# Patient Record
Sex: FEMALE | URBAN - METROPOLITAN AREA
[De-identification: names, ages, dates, MRNs, and addresses within clinical notes are randomized per-mention and may not be internally consistent; named-entity substitution may affect disease eponyms.]

---

## 2017-06-26 ENCOUNTER — TRANSFERRED RECORDS (OUTPATIENT)
Dept: HEALTH INFORMATION MANAGEMENT | Facility: CLINIC | Age: 62
End: 2017-06-26

## 2017-07-07 ENCOUNTER — TRANSFERRED RECORDS (OUTPATIENT)
Dept: HEALTH INFORMATION MANAGEMENT | Facility: CLINIC | Age: 62
End: 2017-07-07

## 2017-10-16 ENCOUNTER — TRANSFERRED RECORDS (OUTPATIENT)
Dept: HEALTH INFORMATION MANAGEMENT | Facility: CLINIC | Age: 62
End: 2017-10-16

## 2017-10-18 ENCOUNTER — TRANSFERRED RECORDS (OUTPATIENT)
Dept: HEALTH INFORMATION MANAGEMENT | Facility: CLINIC | Age: 62
End: 2017-10-18

## 2017-10-31 ENCOUNTER — MEDICAL CORRESPONDENCE (OUTPATIENT)
Dept: HEALTH INFORMATION MANAGEMENT | Facility: CLINIC | Age: 62
End: 2017-10-31

## 2017-11-07 NOTE — TELEPHONE ENCOUNTER
APPT INFO    Date /Time:  11/20/17 10:30AM   Reason for Appt:  Lower extremity, abnormal MRI    Ref Provider/Clinic:  BRET CONNOR/ [facility]    Are there internal records? If yes, list:  none   Patient Contact (Y/N) & Call Details: No- referral   Action: Reviewed records      OUTSIDE RECORDS CHECKLIST     CLINIC NAME COMMENTS REC (x) IMG (x)   [facility]  x            Records Received From: [facility]     Date/Exam/Location  (specify location if different)   Office Notes:  10/16/17, 10/18/17   Radiology Reports: - left knee 10/16/17, 6/26/17  - left tib fib 6/26/17  - MRI left ankle 7/7/15  - MRI tib fib 7/7/17    Mariam Wilkerson Notified (Y/N): no, waiting for images    Missing: - left knee surgery and tibial fracture in 2014  - ORIF by Dr. Asad Valladares [facility] in 2014  - [facility] ORIF done   - injection with Dr. Suresh

## 2017-11-14 ASSESSMENT — ENCOUNTER SYMPTOMS
ARTHRALGIAS: 1
NECK PAIN: 1
BACK PAIN: 1
MYALGIAS: 0
MUSCLE WEAKNESS: 0
STIFFNESS: 1
JOINT SWELLING: 1
MUSCLE CRAMPS: 1

## 2017-11-17 NOTE — TELEPHONE ENCOUNTER
ACTION    What did you do?  Faxed request to [facility] and [facility].

## 2017-11-20 ENCOUNTER — PRE VISIT (OUTPATIENT)
Dept: ORTHOPEDICS | Facility: CLINIC | Age: 62
End: 2017-11-20

## 2017-11-20 ENCOUNTER — TELEPHONE (OUTPATIENT)
Dept: ORTHOPEDICS | Facility: CLINIC | Age: 62
End: 2017-11-20

## 2017-11-20 ENCOUNTER — OFFICE VISIT (OUTPATIENT)
Dept: ORTHOPEDICS | Facility: CLINIC | Age: 62
End: 2017-11-20

## 2017-11-20 VITALS — HEIGHT: 68 IN | BODY MASS INDEX: 33.16 KG/M2 | WEIGHT: 218.8 LBS

## 2017-11-20 DIAGNOSIS — M79.605 PAIN OF LEFT LOWER EXTREMITY: Primary | ICD-10-CM

## 2017-11-20 RX ORDER — PANTOPRAZOLE SODIUM 40 MG/1
40 TABLET, DELAYED RELEASE ORAL
COMMUNITY
Start: 2017-01-27

## 2017-11-20 RX ORDER — POTASSIUM &MAGNESIUM ASPARTATE 250-250 MG
CAPSULE ORAL
COMMUNITY
Start: 2007-12-05

## 2017-11-20 RX ORDER — EPINEPHRINE 0.3 MG/.3ML
0.3 INJECTION SUBCUTANEOUS
COMMUNITY
Start: 2015-04-27

## 2017-11-20 NOTE — MR AVS SNAPSHOT
"              After Visit Summary   11/20/2017    Ely Cannon    MRN: [id number]           Patient Information     Date Of Birth          1955        Visit Information        Provider Department      11/20/2017 10:30 AM Kleber Hamilton MD [facility]        Today's Diagnoses     Pain of left lower extremity    -  1       Follow-ups after your visit        Who to contact     Please call your clinic at [phone] to:    Ask questions about your health    Make or cancel appointments    Discuss your medicines    Learn about your test results    Speak to your doctor   If you have compliments or concerns about an experience at your clinic, or if you wish to file a complaint, please contact [facility] Patient Relations at [phone] or email us at [email or web address]         Additional Information About Your Visit        MyChart Information     [employer] gives you secure access to your electronic health record. If you see a primary care provider, you can also send messages to your care team and make appointments. If you have questions, please call your primary care clinic.  If you do not have a primary care provider, please call [phone] and they will assist you.      [employer] is an electronic gateway that provides easy, online access to your medical records. With [employer], you can request a clinic appointment, read your test results, renew a prescription or communicate with your care team.     To access your existing account, please contact your [facility] or call [phone] for assistance.        Care EveryWhere ID     This is your Care EveryWhere ID. This could be used by other organizations to access your [facility] medical records  [id number]        Your Vitals Were     Height BMI (Body Mass Index)                1.715 m (5' 7.5\") 33.76 kg/m2           Blood Pressure from Last 3 Encounters:   No data found for BP    " Weight from Last 3 Encounters:   11/20/17 99.2 kg (218 lb 12.8 oz)               Primary Care Provider Office Phone # Fax #    Hussein Sorensen [phone]       [facility] [address] [facility] [address]        Equal Access to Services     JOHN PAULKAITLIN ALMA : Hadii aad ku hadasho Soomaali, waaxda luqadaha, qaybta kaalmada adeegyada, waxay ulicesin hayjaysonn adepraveena wade zohra fitzpatrick. So [facility] [phone].    ATENCIÓN: Si habla español, tiene a lopez disposición servicios gratuitos de asistencia lingüística. Hoang al [phone].    We comply with applicable federal civil rights laws and [place] laws. We do not discriminate on the basis of race, color, national origin, age, disability, sex, sexual orientation, or gender identity.            Thank you!     Thank you for choosing [facility]  for your care. Our goal is always to provide you with excellent care. Hearing back from our patients is one way we can continue to improve our services. Please take a few minutes to complete the written survey that you may receive in the mail after your visit with us. Thank you!             Your Updated Medication List - Protect others around you: Learn how to safely use, store and throw away your medicines at www.disposemymeds.org.          This list is accurate as of: 11/20/17 11:59 PM.  Always use your most recent med list.                   Brand Name Dispense Instructions for use Diagnosis    ALLEGRA PO      over the counter allergy meds every 4 hours as needed        EPINEPHrine 0.3 MG/0.3ML injection 2-pack    EPIPEN/ADRENACLICK/or ANY BX GENERIC EQUIV     Inject 0.3 mg into the muscle        order for DME      Knee scooter        pantoprazole 40 MG EC tablet    PROTONIX     Take 40 mg by mouth        Potassium & Magnesium Aspartat 250-250 MG Caps

## 2017-11-20 NOTE — LETTER
11/20/2017       RE: Ely Cannon  [address]  [facility] [address]     Dear Colleague,    Thank you for referring your patient, Ely Cannon, to the [facility] at [facility]. Please see a copy of my visit note below.        [facility], Orthopaedic Oncology Surgery Consultation  by Kleber Hamilton M.D.    Ely Cannon MRN# [id number]   Age: 62 year old YOB: 1955     Requesting physician: Hussein Henriquez            Assessment and Plan:   Assessment:  62-year-old female 3 years status post left lateral tibial plateau open reduction internal fixation with ongoing left shin and ankle pain. Ankle pain is likely attributable to anterior tarsal tunnel syndrome. The shin pain etiology is currently unknown but we will further evaluate as below.     Plan:  -We will review the left lower extremity MRI when we have received it in the interim we'll order a bone scan for further evaluation.  -We will call the patient with the results of the MRI after we have reviewed it           History of Present Illness:   62 year old female who presents for evaluation of left shin and ankle pain. She has previously sustained a left lateral tibial plateau fracture in the winter of 2014 and underwent open reduction internal fixation (please see treatments below). Since that time she states that she has had ongoing pain in her left ankle of uncertain etiology and recently had a left ankle steroid injection July 2017 with some relief of symptoms. She also has a dull aching pain in her left shin which started in May 2017. Despite a left knee steroid injection which decreased her knee pain she has ongoing shin pain. It does not seem to be exacerbated with weightbearing but is very painful if it gets bumped. Both of these pains are greatly affecting her ability to work. She works as a  and is currently working only part-time doing seated  "tasks.    Background history:  DX:  1. July 2017 Left tibial diaphysis stress reaction.    TREATMENTS:  1. Winter 2014, open reduction internal fixation of left, lateral tibial plateau fracture, Dr. Asad Valladares.  2. July 2017 left ankle intra-articular steroid injection  3. October 2017 left knee intra-articular steroid injection         Physical Exam:     EXAMINATION pertinent findings:   VITAL SIGNS: Height 1.715 m (5' 7.5\"), weight 99.2 kg (218 lb 12.8 oz).  Body mass index is 33.76 kg/(m^2).  RESP: non labored breathing   ABD: benign   SKIN: grossly normal   LYMPHATIC: grossly normal   NEURO: grossly normal   VASCULAR: satisfactory perfusion of all extremities   MUSCULOSKELETAL:   Ambulates with reciprocal heel toe gait but slight antalgia on the left. Left lateral knee incision is well-healed without erythema or drainage, there is no effusion erythema or warmth. She tolerates knee range of motion from 0-100  the knee is stable to varus and valgus stress at 0 and 30  of flexion. With a tenderness to deep palpation over the tibial diaphysis medially. There is palpable crepitus with subtalar inversion eversion but anterior  dorsiflexion and plantar flexion are negative for instability.  There is reproducible shocklike sensation with Tinel's over the anterior aspect of the ankle joint. 5 out of 5 strength tibialis anterior gastroc psoas complex extensor hallucis longus sensation intact to light touch in the sural saphenous tibial superficial and deep peroneal nerve distributions, palpable dorsalis pedis pulses.                Data:   All laboratory data reviewed  All imaging study reports reviewed. Report as below as we were unable to view the images from the outside hospital.     MRI Tibia/Fibula:7/7/2017:    HISTORY:  Chronic left shin pain with history of previous fracture and surgery of the proximal tibia in 2014.      Technique:  Routine long bone protocol.      Findings:  There is a long segment of " abnormal bone marrow signal intensity seen throughout the mid and distal diaphysis of the left tibia consisting of patchy increased T2 signal intensity. This is seen over a length of approximately 18 cm. No associated fracture is noted. There is a circumferential periosteal and soft tissue halo of increased T2 signal intensity about the bone at this level as well. The fibula is unremarkable with the exception of a degenerative cyst distally.  No soft tissue mass lesion or fluid collection is noted. The tendons are unremarkable.      Impression:  Long segment of abnormal bone marrow signal intensity and surrounding periosteal/soft tissue signal intensity in the mid and distal diaphysis of the tibia. Findings could represent osteomyelitis in the appropriate setting for infection. Consider also possible severe stress reaction in the absence of clinical findings for infection. Tumor is considered less likely given the long segment of involvement but this cannot be entirely excluded. A bone infarct should also be considered but this does not have the typical ring-like configuration for infarction.      Attending MD (Dr. Kleber Hamilton) :  This patient was seen and evaluated by me including a history, exam, and interpretation of all imaging and/or lab data.  A training physician (resident/fellow), who also saw the patient, has documented the clinic visit in the attached note using voice recognition dictation software, as such, transcription errors may be present.    MD Hever Moreno Family Professor  Oncology and Adult Reconstructive Surgery  Dept Orthopaedic Surgery, [facility]  [phone] office, [phone] pager  [email or web address]              DATA for DOCUMENTATION:         Past Medical History:     Patient Active Problem List   Diagnosis     Acute pain of right knee     Arthritis     Elevated blood pressure     Esophageal reflux     Other corticoadrenal overactivity     Osteopenia     Pain of  "lower extremity     Rectocele     Tobacco use disorder     Past Medical History:   Diagnosis Date     Arthritis      Back injury      Chronic osteoarthritis      Neck injuries          Also see scanned health assessment forms.       Past Surgical History:     Past Surgical History:   Procedure Laterality Date     C STOMACH SURGERY PROCEDURE UNLISTED     -cholecystectomy prior gallbladder tumor? In 1986 metastases  -Hysterectomy  -Breast lumpectomy in 1986, benign         Social History:     Social History     Social History     Marital status:      Spouse name: N/A     Number of children: N/A     Years of education: N/A     Occupational History     Not on file.     Social History Main Topics     Smoking status: Current Every Day Smoker     Packs/day: 1.00     Years: 20.00     Types: Cigarettes     Smokeless tobacco: Never Used     Alcohol use Yes      Comment: Social     Drug use: No     Sexual activity: Yes     Partners: Male     Birth control/ protection: Post-menopausal, Female Surgical     Other Topics Concern     Not on file     Social History Narrative     No narrative on file            Family History:       Family History   Problem Relation Age of Onset     DIABETES Brother      Hypertension Brother      Bone Cancer Brother      CEREBROVASCULAR DISEASE Father      Breast Cancer Maternal Grandmother      MENTAL ILLNESS Maternal Grandmother      Colon Cancer Mother      Colon Cancer Maternal Grandfather      Alcoholism Brother     family history of colon cancer and a brother with \"bone cancer\"         Medications:     Current Outpatient Prescriptions   Medication Sig     EPINEPHrine (EPIPEN/ADRENACLICK/OR ANY BX GENERIC EQUIV) 0.3 MG/0.3ML injection 2-pack Inject 0.3 mg into the muscle     order for DME Knee scooter     Fexofenadine HCl (ALLEGRA PO) over the counter allergy meds every 4 hours as needed      pantoprazole (PROTONIX) 40 MG EC tablet Take 40 mg by mouth     Mag Aspart-Potassium Aspart " (POTASSIUM & MAGNESIUM ASPARTAT) 250-250 MG CAPS      No current facility-administered medications for this visit.               Review of Systems:   A comprehensive 10 point review of systems (constitutional, ENT, cardiac, peripheral vascular, lymphatic, respiratory, GI, , Musculoskeletal, skin, Neurological) was performed and found to be negative except as described in this note.     See intake form completed by patient      Again, thank you for allowing me to participate in the care of your patient.      Sincerely,    Kleber Hamilton MD

## 2017-11-20 NOTE — TELEPHONE ENCOUNTER
Phone Call:    Who did you talk to? (or) Who did you call?  [facility]   Call Detail/Action:  imaging disc was sent on Friday

## 2017-11-20 NOTE — NURSING NOTE
"Chief Complaint   Patient presents with     Consult     Pt states that she is here today for an Abnormal MRI. She states that something was detected on her Left Lower Extremity. Referring:  BRET CONNOR       62 year old  1955    Ht 1.715 m (5' 7.5\")  Wt 99.2 kg (218 lb 12.8 oz)  BMI 33.76 kg/m2           [id number], [place] - [address]    Allergies   Allergen Reactions     Diatrizoate Shortness Of Breath     Sodium Hypochlorite Shortness Of Breath     Other reaction(s): Respiratory Distress     Wasp Venom Protein Shortness Of Breath     Bee Nausea and Vomiting     Contrast Dye Nausea     Current Outpatient Prescriptions   Medication     EPINEPHrine (EPIPEN/ADRENACLICK/OR ANY BX GENERIC EQUIV) 0.3 MG/0.3ML injection 2-pack     order for DME     Fexofenadine HCl (ALLEGRA PO)     pantoprazole (PROTONIX) 40 MG EC tablet     Mag Aspart-Potassium Aspart (POTASSIUM & MAGNESIUM ASPARTAT) 250-250 MG CAPS     No current facility-administered medications for this visit.            Viviane Ann C.M.A.       "

## 2017-11-20 NOTE — PROGRESS NOTES
[facility], Orthopaedic Oncology Surgery Consultation  by Kleber Hamilton M.D.    Ely Cannon MRN# [id number]   Age: 62 year old YOB: 1955     Requesting physician: Hussein Henriquez            Assessment and Plan:   Assessment:  62-year-old female 3 years status post left lateral tibial plateau open reduction internal fixation with ongoing left shin and ankle pain. Ankle pain is likely attributable to anterior tarsal tunnel syndrome. The shin pain etiology is currently unknown but we will further evaluate as below.     Plan:  -We will review the left lower extremity MRI when we have received it in the interim we'll order a bone scan for further evaluation.  -We will call the patient with the results of the MRI after we have reviewed it           History of Present Illness:   62 year old female who presents for evaluation of left shin and ankle pain. She has previously sustained a left lateral tibial plateau fracture in the winter of 2014 and underwent open reduction internal fixation (please see treatments below). Since that time she states that she has had ongoing pain in her left ankle of uncertain etiology and recently had a left ankle steroid injection July 2017 with some relief of symptoms. She also has a dull aching pain in her left shin which started in May 2017. Despite a left knee steroid injection which decreased her knee pain she has ongoing shin pain. It does not seem to be exacerbated with weightbearing but is very painful if it gets bumped. Both of these pains are greatly affecting her ability to work. She works as a  and is currently working only part-time doing seated tasks.    Background history:  DX:  1. July 2017 Left tibial diaphysis stress reaction.    TREATMENTS:  1. Winter 2014, open reduction internal fixation of left, lateral tibial plateau fracture, Dr. Asad Valladares.  2. July 2017 left ankle intra-articular steroid injection  3. October 2017  "left knee intra-articular steroid injection         Physical Exam:     EXAMINATION pertinent findings:   VITAL SIGNS: Height 1.715 m (5' 7.5\"), weight 99.2 kg (218 lb 12.8 oz).  Body mass index is 33.76 kg/(m^2).  RESP: non labored breathing   ABD: benign   SKIN: grossly normal   LYMPHATIC: grossly normal   NEURO: grossly normal   VASCULAR: satisfactory perfusion of all extremities   MUSCULOSKELETAL:   Ambulates with reciprocal heel toe gait but slight antalgia on the left. Left lateral knee incision is well-healed without erythema or drainage, there is no effusion erythema or warmth. She tolerates knee range of motion from 0-100  the knee is stable to varus and valgus stress at 0 and 30  of flexion. With a tenderness to deep palpation over the tibial diaphysis medially. There is palpable crepitus with subtalar inversion eversion but anterior  dorsiflexion and plantar flexion are negative for instability.  There is reproducible shocklike sensation with Tinel's over the anterior aspect of the ankle joint. 5 out of 5 strength tibialis anterior gastroc psoas complex extensor hallucis longus sensation intact to light touch in the sural saphenous tibial superficial and deep peroneal nerve distributions, palpable dorsalis pedis pulses.                Data:   All laboratory data reviewed  All imaging study reports reviewed. Report as below as we were unable to view the images from the outside hospital.     MRI Tibia/Fibula:7/7/2017:    HISTORY:  Chronic left shin pain with history of previous fracture and surgery of the proximal tibia in 2014.      Technique:  Routine long bone protocol.      Findings:  There is a long segment of abnormal bone marrow signal intensity seen throughout the mid and distal diaphysis of the left tibia consisting of patchy increased T2 signal intensity. This is seen over a length of approximately 18 cm. No associated fracture is noted. There is a circumferential periosteal and soft " tissue halo of increased T2 signal intensity about the bone at this level as well. The fibula is unremarkable with the exception of a degenerative cyst distally.  No soft tissue mass lesion or fluid collection is noted. The tendons are unremarkable.      Impression:  Long segment of abnormal bone marrow signal intensity and surrounding periosteal/soft tissue signal intensity in the mid and distal diaphysis of the tibia. Findings could represent osteomyelitis in the appropriate setting for infection. Consider also possible severe stress reaction in the absence of clinical findings for infection. Tumor is considered less likely given the long segment of involvement but this cannot be entirely excluded. A bone infarct should also be considered but this does not have the typical ring-like configuration for infarction.      Attending MD (Dr. Kleber Hamilton) :  This patient was seen and evaluated by me including a history, exam, and interpretation of all imaging and/or lab data.  A training physician (resident/fellow), who also saw the patient, has documented the clinic visit in the attached note using voice recognition dictation software, as such, transcription errors may be present.    Kleber Hamilton MD  [facility] Family Professor  Oncology and Adult Reconstructive Surgery  Dept Orthopaedic Surgery, [facility]  [phone] office, [phone] pager  [email or web address]              DATA for DOCUMENTATION:         Past Medical History:     Patient Active Problem List   Diagnosis     Acute pain of right knee     Arthritis     Elevated blood pressure     Esophageal reflux     Other corticoadrenal overactivity     Osteopenia     Pain of lower extremity     Rectocele     Tobacco use disorder     Past Medical History:   Diagnosis Date     Arthritis      Back injury      Chronic osteoarthritis      Neck injuries          Also see scanned health assessment forms.       Past Surgical History:     Past Surgical History:  "  Procedure Laterality Date     C STOMACH SURGERY PROCEDURE UNLISTED     -cholecystectomy prior gallbladder tumor? In 1986 metastases  -Hysterectomy  -Breast lumpectomy in 1986, benign         Social History:     Social History     Social History     Marital status:      Spouse name: N/A     Number of children: N/A     Years of education: N/A     Occupational History     Not on file.     Social History Main Topics     Smoking status: Current Every Day Smoker     Packs/day: 1.00     Years: 20.00     Types: Cigarettes     Smokeless tobacco: Never Used     Alcohol use Yes      Comment: Social     Drug use: No     Sexual activity: Yes     Partners: Male     Birth control/ protection: Post-menopausal, Female Surgical     Other Topics Concern     Not on file     Social History Narrative     No narrative on file            Family History:       Family History   Problem Relation Age of Onset     DIABETES Brother      Hypertension Brother      Bone Cancer Brother      CEREBROVASCULAR DISEASE Father      Breast Cancer Maternal Grandmother      MENTAL ILLNESS Maternal Grandmother      Colon Cancer Mother      Colon Cancer Maternal Grandfather      Alcoholism Brother     family history of colon cancer and a brother with \"bone cancer\"         Medications:     Current Outpatient Prescriptions   Medication Sig     EPINEPHrine (EPIPEN/ADRENACLICK/OR ANY BX GENERIC EQUIV) 0.3 MG/0.3ML injection 2-pack Inject 0.3 mg into the muscle     order for DME Knee scooter     Fexofenadine HCl (ALLEGRA PO) over the counter allergy meds every 4 hours as needed      pantoprazole (PROTONIX) 40 MG EC tablet Take 40 mg by mouth     Mag Aspart-Potassium Aspart (POTASSIUM & MAGNESIUM ASPARTAT) 250-250 MG CAPS      No current facility-administered medications for this visit.               Review of Systems:   A comprehensive 10 point review of systems (constitutional, ENT, cardiac, peripheral vascular, lymphatic, respiratory, GI, , " Musculoskeletal, skin, Neurological) was performed and found to be negative except as described in this note.     See intake form completed by patient    Answers for HPI/ROS submitted by the patient on 11/14/2017   General Symptoms: No  Skin Symptoms: No  HENT Symptoms: No  EYE SYMPTOMS: No  HEART SYMPTOMS: No  LUNG SYMPTOMS: No  INTESTINAL SYMPTOMS: No  URINARY SYMPTOMS: No  GYNECOLOGIC SYMPTOMS: No  BREAST SYMPTOMS: No  SKELETAL SYMPTOMS: Yes  BLOOD SYMPTOMS: No  NERVOUS SYSTEM SYMPTOMS: No  MENTAL HEALTH SYMPTOMS: No  Back pain: Yes  Muscle aches: No  Neck pain: Yes  Swollen joints: Yes  Joint pain: Yes  Bone pain: Yes  Muscle cramps: Yes  Muscle weakness: No  Joint stiffness: Yes  Bone fracture: No

## 2017-11-20 NOTE — TELEPHONE ENCOUNTER
Received Imaging From: [facility]    Image Type (x): Disc:___x__  Pacs:_____      Exam Date/Name:  Comments: will put in ortho

## 2017-11-20 NOTE — TELEPHONE ENCOUNTER
[facility] called to apologize for the miscommunication in regards to imaging Dr. Hamilton was supposed to see today. They will overnight it to Mariam Castro at [address] so she can scan it in for Dr. Hamilton to review.

## 2017-11-22 DIAGNOSIS — M25.561 ACUTE PAIN OF RIGHT KNEE: Primary | ICD-10-CM

## 2017-11-28 DIAGNOSIS — M25.561 ACUTE PAIN OF RIGHT KNEE: Primary | ICD-10-CM

## 2017-12-11 ENCOUNTER — TRANSFERRED RECORDS (OUTPATIENT)
Dept: HEALTH INFORMATION MANAGEMENT | Facility: CLINIC | Age: 62
End: 2017-12-11

## 2018-01-11 ENCOUNTER — TELEPHONE (OUTPATIENT)
Dept: ORTHOPEDICS | Facility: CLINIC | Age: 63
End: 2018-01-11

## 2018-01-11 NOTE — LETTER
1/11/2018      RE: Ely Cannon  [address]  [facility] [address]     Dear Russ Martinez and Christopher,    I called Ely and spoke with her about her test results as well as imaging studies.  Her vitamin D level is low at 27.8 and parathyroid hormone slightly elevated at 106 with a normal calcium of 9.8.  I believe this is indicative of vitamin D deficiency and this would put her at risk for stress reactions within the tibia or elsewhere.  Notably, vitamin D level in 2010 was 30.8.    Imaging tests include bone scan and MRI examination.  The MRI is notable for some inflammatory signal in the mid and distal tibia.  It is unclear whether or not there is any marrow replacement process present.  Bone scan shows no increased activity in this region.  This raises the suspicion of myeloma.  However a protein electrophoresis in October 24, 2017 from outside facility is normal.    Recommendations:  I advised that she embark upon an active vitamin D and calcium replenishment program.  This can be supervised by her primary care physician.  I further recommended that she sees all forms of nicotine ingestion as an aid to improving her bone healing potential.  A follow-up MRI examination in 3 months for comparison to the previous study would be appropriate.  The MRI should cover the entire tibia.  I indicated that it would be my pleasure to see her and review these follow-up imaging tests with her or they may be done locally.    MD Hever Moreno Family Professor  Oncology and Adult Reconstructive Surgery  Dept Orthopaedic Surgery, [facility]  [phone] office, [phone] pager  [email or web address]          Kleber Hamilton MD

## 2018-01-11 NOTE — TELEPHONE ENCOUNTER
I called Ely and spoke with her about her test results as well as imaging studies.  Her vitamin D level is low at 27.8 and parathyroid hormone slightly elevated at 106 with a normal calcium of 9.8.  I believe this is indicative of vitamin D deficiency and this would put her at risk for stress reactions within the tibia or elsewhere.  Notably, vitamin D level in 2010 was 30.8.    Imaging tests include bone scan and MRI examination.  The MRI is notable for some inflammatory signal in the mid and distal tibia.  It is unclear whether or not there is any marrow replacement process present.  Bone scan shows no increased activity in this region.  This raises the suspicion of myeloma.  However a protein electrophoresis in October 24, 2017 from outside facility is normal.    Recommendations:  I advised that she embark upon an active vitamin D and calcium replenishment program.  This can be supervised by her primary care physician.  I further recommended that she sees all forms of nicotine ingestion as an aid to improving her bone healing potential.  A follow-up MRI examination in 3 months for comparison to the previous study would be appropriate.  The MRI should cover the entire tibia.  I indicated that it would be my pleasure to see her and review these follow-up imaging tests with her or they may be done locally.    MD Hever Moreno Family Professor  Oncology and Adult Reconstructive Surgery  Dept Orthopaedic Surgery, [facility]  [phone] office, [phone] pager  [email or web address]

## 2018-01-28 ENCOUNTER — HEALTH MAINTENANCE LETTER (OUTPATIENT)
Age: 63
End: 2018-01-28

## 2020-03-11 ENCOUNTER — HEALTH MAINTENANCE LETTER (OUTPATIENT)
Age: 65
End: 2020-03-11

## 2020-12-27 ENCOUNTER — HEALTH MAINTENANCE LETTER (OUTPATIENT)
Age: 65
End: 2020-12-27

## 2021-06-19 ENCOUNTER — HEALTH MAINTENANCE LETTER (OUTPATIENT)
Age: 66
End: 2021-06-19

## 2021-10-09 ENCOUNTER — HEALTH MAINTENANCE LETTER (OUTPATIENT)
Age: 66
End: 2021-10-09

## 2022-03-26 ENCOUNTER — HEALTH MAINTENANCE LETTER (OUTPATIENT)
Age: 67
End: 2022-03-26

## 2022-07-16 ENCOUNTER — HEALTH MAINTENANCE LETTER (OUTPATIENT)
Age: 67
End: 2022-07-16

## 2022-09-17 ENCOUNTER — HEALTH MAINTENANCE LETTER (OUTPATIENT)
Age: 67
End: 2022-09-17

## 2023-07-29 ENCOUNTER — HEALTH MAINTENANCE LETTER (OUTPATIENT)
Age: 68
End: 2023-07-29